# Patient Record
Sex: MALE | NOT HISPANIC OR LATINO
[De-identification: names, ages, dates, MRNs, and addresses within clinical notes are randomized per-mention and may not be internally consistent; named-entity substitution may affect disease eponyms.]

---

## 2020-02-06 PROBLEM — Z00.00 ENCOUNTER FOR PREVENTIVE HEALTH EXAMINATION: Status: ACTIVE | Noted: 2020-02-06

## 2020-08-10 ENCOUNTER — APPOINTMENT (OUTPATIENT)
Dept: HEMATOLOGY ONCOLOGY | Facility: CLINIC | Age: 60
End: 2020-08-10
Payer: COMMERCIAL

## 2020-08-10 ENCOUNTER — RESULT REVIEW (OUTPATIENT)
Age: 60
End: 2020-08-10

## 2020-08-10 VITALS
RESPIRATION RATE: 18 BRPM | WEIGHT: 166 LBS | TEMPERATURE: 98.3 F | HEART RATE: 80 BPM | BODY MASS INDEX: 23.24 KG/M2 | HEIGHT: 71 IN | DIASTOLIC BLOOD PRESSURE: 79 MMHG | SYSTOLIC BLOOD PRESSURE: 137 MMHG | OXYGEN SATURATION: 96 %

## 2020-08-10 DIAGNOSIS — Z80.0 FAMILY HISTORY OF MALIGNANT NEOPLASM OF DIGESTIVE ORGANS: ICD-10-CM

## 2020-08-10 DIAGNOSIS — Z78.9 OTHER SPECIFIED HEALTH STATUS: ICD-10-CM

## 2020-08-10 DIAGNOSIS — Z86.711 PERSONAL HISTORY OF PULMONARY EMBOLISM: ICD-10-CM

## 2020-08-10 DIAGNOSIS — Z82.49 FAMILY HISTORY OF ISCHEMIC HEART DISEASE AND OTHER DISEASES OF THE CIRCULATORY SYSTEM: ICD-10-CM

## 2020-08-10 PROCEDURE — 99205 OFFICE O/P NEW HI 60 MIN: CPT

## 2020-08-10 RX ORDER — ASPIRIN 81 MG/1
81 TABLET, CHEWABLE ORAL
Refills: 0 | Status: ACTIVE | COMMUNITY

## 2020-08-14 NOTE — HISTORY OF PRESENT ILLNESS
[de-identified] : 60 year old male presents today for initial consultation of a history of Pulmonary Embolism.  Patient experienced an unprovoked PE on May 31, 2008. He was sedentary and overweight at the time.  He was hospitalized at [facility] for 8-9 days and treated with Coumadin and Heparin. Patient remained on the Coumadin until November 2016 when he experienced hematuria and spent one night in the hospital. General practitioner, Dr. Armas than removed him from Coumadin and placed him on Asprin 81mg/daily. Patient remains on baby Asprin with no complaints. Patient visits us today d/t a change in insurance and he wishes to see new doctors at this time. Patient was being treated by Dr. Marquez of the [facility]. Patient referred to us by Dr. Vinson.\par \par Seen by Dr. Nolasco who indicated he had the presence of an IgM antiphospholipid antibody.  Patient states he is unaware of this and has not seen him since 2014. \par \par Patient has lost 61lbs intentionally since PE. Patient denies any physical complaints at this time. Denies chest pain, swelling, night sweats, SOB. \par \par Patient denies a family history of blood disorders. Mother passed away from liver metastasis with an unknown source. Father passed from stomach ca. \par \par Patient denies smoking, drinking or using illicit drugs.

## 2020-08-14 NOTE — ASSESSMENT
[FreeTextEntry1] : 60 with one episode of PE/ DVT in 2008 possible provocation by lifestyle- immobilization, weight ( lost since then 60 lbs) with positive ab for APS- presents for evaluation.  Stopped warfarin in 2016- after bleeding episode.  \par He takes ASA daily - continue. \par \par Hypercoagulable w/u today - repeat APS, LAC. \par \par Colonoscopy 2015\par Father 92, stomach ca\par Mother 76- CUP \par Parents from [place], \par MGF - cancer - skin? \par Brother 45 MI,\par No family h/o miscarriages\par \par \par

## 2020-08-24 ENCOUNTER — TRANSCRIPTION ENCOUNTER (OUTPATIENT)
Age: 60
End: 2020-08-24

## 2020-09-29 ENCOUNTER — RESULT REVIEW (OUTPATIENT)
Age: 60
End: 2020-09-29

## 2020-09-29 ENCOUNTER — APPOINTMENT (OUTPATIENT)
Dept: HEMATOLOGY ONCOLOGY | Facility: CLINIC | Age: 60
End: 2020-09-29
Payer: COMMERCIAL

## 2020-09-29 VITALS
DIASTOLIC BLOOD PRESSURE: 81 MMHG | OXYGEN SATURATION: 99 % | TEMPERATURE: 98.5 F | BODY MASS INDEX: 19.12 KG/M2 | HEART RATE: 81 BPM | RESPIRATION RATE: 18 BRPM | SYSTOLIC BLOOD PRESSURE: 125 MMHG | WEIGHT: 136.6 LBS | HEIGHT: 71 IN

## 2020-09-29 DIAGNOSIS — I26.99 OTHER PULMONARY EMBOLISM W/OUT ACUTE COR PULMONALE: ICD-10-CM

## 2020-09-29 DIAGNOSIS — D68.61 ANTIPHOSPHOLIPID SYNDROME: ICD-10-CM

## 2020-09-29 PROCEDURE — 99214 OFFICE O/P EST MOD 30 MIN: CPT

## 2020-09-29 NOTE — HISTORY OF PRESENT ILLNESS
[de-identified] : 60 year old male presents today for initial consultation of a history of Pulmonary Embolism.  Patient experienced an unprovoked PE on May 31, 2008. He was sedentary and overweight at the time.  He was hospitalized at [facility] for 8-9 days and treated with Coumadin and Heparin. Patient remained on the Coumadin until November 2016 when he experienced hematuria and spent one night in the hospital. General practitioner, Dr. Armas than removed him from Coumadin and placed him on Asprin 81mg/daily. Patient remains on baby Asprin with no complaints. Patient visits us today d/t a change in insurance and he wishes to see new doctors at this time. Patient was being treated by Dr. Marquez of the [facility]. Patient referred to us by Dr. Vinson.\par \par Seen by Dr. Nolasco who indicated he had the presence of an IgM antiphospholipid antibody.  Patient states he is unaware of this and has not seen him since 2014. \par \par Patient has lost 61lbs intentionally since PE. Patient denies any physical complaints at this time. Denies chest pain, swelling, night sweats, SOB. \par \par Patient denies a family history of blood disorders. Mother passed away from liver metastasis with an unknown source. Father passed from stomach ca. \par \par Patient denies smoking, drinking or using illicit drugs.  [de-identified] : Patient wants to know if he should continue on aspirin.

## 2020-09-29 NOTE — ASSESSMENT
[FreeTextEntry1] : 60 with one episode of PE/ DVT in 2008 possible provocation by lifestyle- immobilization, weight ( lost since then 60 lbs) with positive ab for APS- presents for evaluation.  Stopped warfarin in 2016- after bleeding episode.  \par He takes ASA daily - continue. \par \par Hypercoagulable reveals weak anticardiolipin antibody- Asymptomatic, continues to have weight loss due to lifestyle modifications, no other episodes of clotting- can continue baby ASA- f/u in  months - 1 year\par \par Colonoscopy 2015\par Father 92, stomach ca\par Mother 76- CUP \par Parents from [place], \par MGF - cancer - skin? \par Brother 45 MI,\par No family h/o miscarriages\par \par \par

## 2021-03-30 ENCOUNTER — APPOINTMENT (OUTPATIENT)
Dept: HEMATOLOGY ONCOLOGY | Facility: CLINIC | Age: 61
End: 2021-03-30